# Patient Record
Sex: FEMALE | Race: WHITE | NOT HISPANIC OR LATINO | URBAN - METROPOLITAN AREA
[De-identification: names, ages, dates, MRNs, and addresses within clinical notes are randomized per-mention and may not be internally consistent; named-entity substitution may affect disease eponyms.]

---

## 2024-09-18 ENCOUNTER — OFFICE VISIT (OUTPATIENT)
Dept: URGENT CARE | Age: 39
End: 2024-09-18
Payer: COMMERCIAL

## 2024-09-18 VITALS
TEMPERATURE: 97.8 F | SYSTOLIC BLOOD PRESSURE: 137 MMHG | RESPIRATION RATE: 18 BRPM | DIASTOLIC BLOOD PRESSURE: 90 MMHG | OXYGEN SATURATION: 99 % | HEART RATE: 90 BPM

## 2024-09-18 DIAGNOSIS — L02.211 ABSCESS OF SKIN OF ABDOMEN: Primary | ICD-10-CM

## 2024-09-18 PROCEDURE — 87077 CULTURE AEROBIC IDENTIFY: CPT

## 2024-09-18 PROCEDURE — 87075 CULTR BACTERIA EXCEPT BLOOD: CPT

## 2024-09-18 PROCEDURE — 87070 CULTURE OTHR SPECIMN AEROBIC: CPT

## 2024-09-18 PROCEDURE — 87205 SMEAR GRAM STAIN: CPT

## 2024-09-18 PROCEDURE — 87186 SC STD MICRODIL/AGAR DIL: CPT

## 2024-09-18 RX ORDER — SULFAMETHOXAZOLE AND TRIMETHOPRIM 800; 160 MG/1; MG/1
1 TABLET ORAL 2 TIMES DAILY
Qty: 20 TABLET | Refills: 0 | Status: SHIPPED | OUTPATIENT
Start: 2024-09-18 | End: 2024-09-28

## 2024-09-18 ASSESSMENT — ENCOUNTER SYMPTOMS
CHILLS: 0
NAUSEA: 0
FEVER: 0
WOUND: 1
VOMITING: 0

## 2024-09-18 NOTE — PROGRESS NOTES
Subjective   Patient ID: Prisca Pleitez is a 38 y.o. female. They present today with a chief complaint of Cyst (Cyst to left lower quad of abdomen x 3 days with the possibility of infection. ).    History of Present Illness  Patient is a 38 year old female who presents today with the skin infection to left lower abdomen. She states it started as a pimple about a week ago. She states over the past couple of days it has become increasingly swollen, red and painful period she states that there has been bloody puss discharge from the area. She denies any fevers, chills, nausea, vomiting or body aches. She denies any history of similar lesions.          Past Medical History  Allergies as of 09/18/2024    (No Known Allergies)       (Not in a hospital admission)       No past medical history on file.    No past surgical history on file.         Review of Systems  Review of Systems   Constitutional:  Negative for chills and fever.   Gastrointestinal:  Negative for nausea and vomiting.   Skin:  Positive for wound.        Erythema, discharge, warmth                                  Objective    Vitals:    09/18/24 1841   BP: 137/90   Pulse: 90   Resp: 18   Temp: 36.6 °C (97.8 °F)   SpO2: 99%     No LMP recorded.    Physical Exam  Vitals and nursing note reviewed.   Constitutional:       Appearance: Normal appearance.   Cardiovascular:      Rate and Rhythm: Normal rate.      Heart sounds: Normal heart sounds.   Pulmonary:      Effort: Pulmonary effort is normal.      Breath sounds: Normal breath sounds.   Skin:     Comments: Left lower abdomen shows approx 10cm x 8cm area of erythema and warmth with central 2cm indurated and erythematous lesion with central opening and blood and pus discharge present (culture taken)         Procedures    Point of Care Test & Imaging Results from this visit  No results found for this visit on 09/18/24.   No results found.    Diagnostic study results (if any) were reviewed by Elizabeth Melendez  QUINCY.    Assessment/Plan   Allergies, medications, history, and pertinent labs/EKGs/Imaging reviewed by Elizabeth Melendez PA-C.     Medical Decision Making  MDM- Signs and symptoms consistent with cutaneous abscess. No evidence of sepsis. Incision and drainage not needed as it is open and draining. Culture taken. Patient was placed on antibiotic therapy. See procedure note for further detail. Return to clinic if signs/symptoms change. Go to ED if fevers develop or significant worsening of symptoms occur. Patient verbalized understanding and agrees with plan.      Orders and Diagnoses  There are no diagnoses linked to this encounter.    Medical Admin Record      Patient disposition: Home    Electronically signed by Elizabeth Melendez PA-C  7:00 PM

## 2024-09-21 LAB
BACTERIA SPEC CULT: ABNORMAL
GRAM STN SPEC: ABNORMAL
GRAM STN SPEC: ABNORMAL